# Patient Record
Sex: MALE | Race: WHITE | NOT HISPANIC OR LATINO | Employment: FULL TIME | URBAN - METROPOLITAN AREA
[De-identification: names, ages, dates, MRNs, and addresses within clinical notes are randomized per-mention and may not be internally consistent; named-entity substitution may affect disease eponyms.]

---

## 2023-10-03 ENCOUNTER — OFFICE VISIT (OUTPATIENT)
Dept: PRIMARY CARE | Facility: CLINIC | Age: 43
End: 2023-10-03
Payer: COMMERCIAL

## 2023-10-03 VITALS
WEIGHT: 217 LBS | OXYGEN SATURATION: 98 % | HEART RATE: 70 BPM | DIASTOLIC BLOOD PRESSURE: 81 MMHG | BODY MASS INDEX: 29.39 KG/M2 | RESPIRATION RATE: 18 BRPM | HEIGHT: 72 IN | TEMPERATURE: 97.5 F | SYSTOLIC BLOOD PRESSURE: 133 MMHG

## 2023-10-03 DIAGNOSIS — G90.2 ACQUIRED RIGHT-SIDED HORNER SYNDROME: ICD-10-CM

## 2023-10-03 DIAGNOSIS — R49.0 HOARSENESS OF VOICE: ICD-10-CM

## 2023-10-03 DIAGNOSIS — Z00.00 HEALTH CARE MAINTENANCE: Primary | ICD-10-CM

## 2023-10-03 DIAGNOSIS — F41.9 ANXIETY: ICD-10-CM

## 2023-10-03 DIAGNOSIS — E55.9 VITAMIN D DEFICIENCY: ICD-10-CM

## 2023-10-03 DIAGNOSIS — Z86.39 HISTORY OF HYPERLIPIDEMIA: ICD-10-CM

## 2023-10-03 DIAGNOSIS — R53.83 FATIGUE, UNSPECIFIED TYPE: ICD-10-CM

## 2023-10-03 DIAGNOSIS — Z86.018 HISTORY OF BENIGN SCHWANNOMA: ICD-10-CM

## 2023-10-03 LAB
POC APPEARANCE, URINE: CLEAR
POC BILIRUBIN, URINE: NEGATIVE
POC BLOOD, URINE: NEGATIVE
POC COLOR, URINE: YELLOW
POC GLUCOSE, URINE: NEGATIVE MG/DL
POC KETONES, URINE: NEGATIVE MG/DL
POC LEUKOCYTES, URINE: NEGATIVE
POC NITRITE,URINE: NEGATIVE
POC PH, URINE: 6.5 PH
POC PROTEIN, URINE: NEGATIVE MG/DL
POC SPECIFIC GRAVITY, URINE: 1.01
POC UROBILINOGEN, URINE: 0.2 EU/DL

## 2023-10-03 PROCEDURE — 1036F TOBACCO NON-USER: CPT | Performed by: FAMILY MEDICINE

## 2023-10-03 PROCEDURE — 81002 URINALYSIS NONAUTO W/O SCOPE: CPT | Performed by: FAMILY MEDICINE

## 2023-10-03 PROCEDURE — 99386 PREV VISIT NEW AGE 40-64: CPT | Performed by: FAMILY MEDICINE

## 2023-10-03 PROCEDURE — 93000 ELECTROCARDIOGRAM COMPLETE: CPT | Performed by: FAMILY MEDICINE

## 2023-10-03 RX ORDER — SERTRALINE HYDROCHLORIDE 100 MG/1
150 TABLET, FILM COATED ORAL DAILY
COMMUNITY

## 2023-10-03 ASSESSMENT — ENCOUNTER SYMPTOMS
LOSS OF SENSATION IN FEET: 0
DEPRESSION: 0
SHORTNESS OF BREATH: 0
HEADACHES: 0
OCCASIONAL FEELINGS OF UNSTEADINESS: 0

## 2023-10-03 ASSESSMENT — PATIENT HEALTH QUESTIONNAIRE - PHQ9
SUM OF ALL RESPONSES TO PHQ9 QUESTIONS 1 AND 2: 0
1. LITTLE INTEREST OR PLEASURE IN DOING THINGS: NOT AT ALL
2. FEELING DOWN, DEPRESSED OR HOPELESS: NOT AT ALL

## 2023-10-03 NOTE — PROGRESS NOTES
Subjective     Gómez Rasmussen is a 43 y.o. male who presents for Annual Exam.    HPI     Pt is here today for annual well exam.     He has hx of anxiety, on zoloft 150 mg daily, sees [facility] psychiatry.      Family hx of HLD in father.      Mother passed from lung cancer (never smoked), age 58.      Review of Systems   Respiratory:  Negative for shortness of breath.    Cardiovascular:  Negative for chest pain.   Neurological:  Negative for headaches.       Objective     Vitals:    10/03/23 1447   BP: 133/81   BP Location: Left arm   Patient Position: Sitting   Pulse: 70   Resp: 18   Temp: 36.4 °C (97.5 °F)   TempSrc: Temporal   SpO2: 98%   Weight: 98.4 kg (217 lb)   Height: 1.829 m (6')        Current Outpatient Medications   Medication Instructions    sertraline (ZOLOFT) 150 mg, oral, Daily        History reviewed. No pertinent surgical history.     Social History     Tobacco Use    Smoking status: Never    Smokeless tobacco: Never   Vaping Use    Vaping Use: Never used        Family History   Problem Relation Name Age of Onset    Lung cancer Mother      Hyperlipidemia Father          Immunization History   Administered Date(s) Administered    Flu vaccine (IIV4), preservative free *Check age/dose* 10/28/2016, 11/06/2022    Influenza, injectable, MDCK, preservative free, quadrivalent 10/01/2020, 09/21/2023    Influenza, seasonal, injectable 10/07/2014    Moderna COVID-19 vaccine, Fall 2023, 12 yeasrs and older (50mcg/0.5mL) 09/21/2023    Pfizer Purple Cap SARS-CoV-2 03/17/2021, 04/07/2021, 11/02/2021        Physical Exam  Vitals reviewed.   Constitutional:       General: He is not in acute distress.     Appearance: Normal appearance.      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    HENT:      Head: Normocephalic and atraumatic.      Comments: Right schwannoma surgical scar at right side of head         Right Ear: Tympanic membrane, ear canal and external ear  normal.      Left Ear: Tympanic membrane, ear canal and external ear normal.      Nose: Nose normal.      Mouth/Throat:      Mouth: Mucous membranes are moist.      Pharynx: Oropharynx is clear. No oropharyngeal exudate or posterior oropharyngeal erythema.   Eyes:      Extraocular Movements: Extraocular movements intact.      Pupils: Pupils are equal, round, and reactive to light.   Neck:      Thyroid: No thyroid mass or thyromegaly.   Cardiovascular:      Rate and Rhythm: Normal rate and regular rhythm.      Heart sounds: No murmur heard.  Pulmonary:      Effort: Pulmonary effort is normal. No respiratory distress.      Breath sounds: Normal breath sounds. No wheezing, rhonchi or rales.   Abdominal:      General: Abdomen is flat. There is no distension.      Palpations: Abdomen is soft.      Tenderness: There is no abdominal tenderness.   Genitourinary:     Testes: Normal.   Musculoskeletal:         General: Normal range of motion.   Lymphadenopathy:      Cervical: No cervical adenopathy.   Skin:     General: Skin is warm and dry.      Findings: No rash.   Neurological:      General: No focal deficit present.      Mental Status: He is alert and oriented to person, place, and time. Mental status is at baseline.   Psychiatric:         Mood and Affect: Mood normal.         Behavior: Behavior normal.         Problem List Items Addressed This Visit       Anxiety    Relevant Orders    TSH with reflex to Free T4 if abnormal    History of benign schwannoma    Acquired right-sided Sushma syndrome    Hoarseness of voice     Other Visit Diagnoses       Health care maintenance    -  Primary    Relevant Orders    POCT UA (nonautomated) manually resulted (Completed)    Comprehensive Metabolic Panel    Lipid Panel    CBC and Auto Differential    Hemoglobin A1C    CT cardiac scoring wo IV contrast    ECG 12 Lead (Completed)    Fatigue, unspecified type        Relevant Orders    TSH with reflex to Free T4 if abnormal    Vitamin D  deficiency        Relevant Orders    Vitamin D 25-Hydroxy,Total (for eval of Vitamin D levels)    History of hyperlipidemia        Relevant Orders    CT cardiac scoring wo IV contrast            Assessment/Plan     Well Exam     Vaccines - tdap declined    Flu/COVID  vaccine utd     Anxiety - sees [facility] - on zoloft 150 mg - controlled    Complete labs    Healthy diet, routine exercise was discussed with patient      Hx of HLD - not on statin    Cardiac risk assessment - I will order a CT calcium score due to patient risk factors.       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      Follow up in 6 months

## 2023-10-10 DIAGNOSIS — R53.83 FATIGUE, UNSPECIFIED TYPE: Primary | ICD-10-CM

## 2023-10-10 DIAGNOSIS — R68.82 LOW LIBIDO: ICD-10-CM

## 2023-10-12 ENCOUNTER — LAB (OUTPATIENT)
Dept: LAB | Facility: LAB | Age: 43
End: 2023-10-12
Payer: COMMERCIAL

## 2023-10-12 DIAGNOSIS — R68.82 LOW LIBIDO: ICD-10-CM

## 2023-10-12 DIAGNOSIS — E55.9 VITAMIN D DEFICIENCY: ICD-10-CM

## 2023-10-12 DIAGNOSIS — R53.83 FATIGUE, UNSPECIFIED TYPE: ICD-10-CM

## 2023-10-12 DIAGNOSIS — F41.9 ANXIETY: ICD-10-CM

## 2023-10-12 DIAGNOSIS — Z00.00 HEALTH CARE MAINTENANCE: ICD-10-CM

## 2023-10-12 LAB
25(OH)D3 SERPL-MCNC: 26 NG/ML (ref 30–100)
ALBUMIN SERPL BCP-MCNC: 4.3 G/DL (ref 3.4–5)
ALP SERPL-CCNC: 53 U/L (ref 33–120)
ALT SERPL W P-5'-P-CCNC: 28 U/L (ref 10–52)
ANION GAP SERPL CALC-SCNC: 12 MMOL/L (ref 10–20)
AST SERPL W P-5'-P-CCNC: 21 U/L (ref 9–39)
BASOPHILS # BLD AUTO: 0.06 X10*3/UL (ref 0–0.1)
BASOPHILS NFR BLD AUTO: 1.2 %
BILIRUB SERPL-MCNC: 0.4 MG/DL (ref 0–1.2)
BUN SERPL-MCNC: 17 MG/DL (ref 6–23)
CALCIUM SERPL-MCNC: 9.3 MG/DL (ref 8.6–10.3)
CHLORIDE SERPL-SCNC: 103 MMOL/L (ref 98–107)
CHOLEST SERPL-MCNC: 198 MG/DL (ref 0–199)
CHOLESTEROL/HDL RATIO: 4.6
CO2 SERPL-SCNC: 31 MMOL/L (ref 21–32)
CREAT SERPL-MCNC: 1 MG/DL (ref 0.5–1.3)
EOSINOPHIL # BLD AUTO: 0.17 X10*3/UL (ref 0–0.7)
EOSINOPHIL NFR BLD AUTO: 3.5 %
ERYTHROCYTE [DISTWIDTH] IN BLOOD BY AUTOMATED COUNT: 12.4 % (ref 11.5–14.5)
EST. AVERAGE GLUCOSE BLD GHB EST-MCNC: 111 MG/DL
GFR SERPL CREATININE-BSD FRML MDRD: >90 ML/MIN/1.73M*2
GLUCOSE SERPL-MCNC: 93 MG/DL (ref 74–99)
HBA1C MFR BLD: 5.5 %
HCT VFR BLD AUTO: 46.3 % (ref 41–52)
HDLC SERPL-MCNC: 42.6 MG/DL
HGB BLD-MCNC: 15.6 G/DL (ref 13.5–17.5)
IMM GRANULOCYTES # BLD AUTO: 0.01 X10*3/UL (ref 0–0.7)
IMM GRANULOCYTES NFR BLD AUTO: 0.2 % (ref 0–0.9)
LDLC SERPL CALC-MCNC: 125 MG/DL
LYMPHOCYTES # BLD AUTO: 2.29 X10*3/UL (ref 1.2–4.8)
LYMPHOCYTES NFR BLD AUTO: 46.6 %
MCH RBC QN AUTO: 28.8 PG (ref 26–34)
MCHC RBC AUTO-ENTMCNC: 33.7 G/DL (ref 32–36)
MCV RBC AUTO: 85 FL (ref 80–100)
MONOCYTES # BLD AUTO: 0.42 X10*3/UL (ref 0.1–1)
MONOCYTES NFR BLD AUTO: 8.6 %
NEUTROPHILS # BLD AUTO: 1.96 X10*3/UL (ref 1.2–7.7)
NEUTROPHILS NFR BLD AUTO: 39.9 %
NON HDL CHOLESTEROL: 155 MG/DL (ref 0–149)
NRBC BLD-RTO: 0 /100 WBCS (ref 0–0)
PLATELET # BLD AUTO: 280 X10*3/UL (ref 150–450)
PMV BLD AUTO: 9.7 FL (ref 7.5–11.5)
POTASSIUM SERPL-SCNC: 4.6 MMOL/L (ref 3.5–5.3)
PROT SERPL-MCNC: 6.7 G/DL (ref 6.4–8.2)
RBC # BLD AUTO: 5.42 X10*6/UL (ref 4.5–5.9)
SODIUM SERPL-SCNC: 141 MMOL/L (ref 136–145)
TRIGL SERPL-MCNC: 150 MG/DL (ref 0–149)
TSH SERPL-ACNC: 2.45 MIU/L (ref 0.44–3.98)
VLDL: 30 MG/DL (ref 0–40)
WBC # BLD AUTO: 4.9 X10*3/UL (ref 4.4–11.3)

## 2023-10-12 PROCEDURE — 82306 VITAMIN D 25 HYDROXY: CPT

## 2023-10-12 PROCEDURE — 80053 COMPREHEN METABOLIC PANEL: CPT

## 2023-10-12 PROCEDURE — 84443 ASSAY THYROID STIM HORMONE: CPT

## 2023-10-12 PROCEDURE — 80061 LIPID PANEL: CPT

## 2023-10-12 PROCEDURE — 84402 ASSAY OF FREE TESTOSTERONE: CPT

## 2023-10-12 PROCEDURE — 85025 COMPLETE CBC W/AUTO DIFF WBC: CPT

## 2023-10-12 PROCEDURE — 83036 HEMOGLOBIN GLYCOSYLATED A1C: CPT

## 2023-10-12 PROCEDURE — 36415 COLL VENOUS BLD VENIPUNCTURE: CPT

## 2023-10-13 ENCOUNTER — APPOINTMENT (OUTPATIENT)
Dept: RADIOLOGY | Facility: CLINIC | Age: 43
End: 2023-10-13
Payer: COMMERCIAL

## 2023-10-17 ENCOUNTER — ANCILLARY PROCEDURE (OUTPATIENT)
Dept: RADIOLOGY | Facility: CLINIC | Age: 43
End: 2023-10-17
Payer: COMMERCIAL

## 2023-10-17 DIAGNOSIS — Z00.00 HEALTH CARE MAINTENANCE: ICD-10-CM

## 2023-10-17 DIAGNOSIS — Z86.39 HISTORY OF HYPERLIPIDEMIA: ICD-10-CM

## 2023-10-17 LAB
TESTOSTERONE FREE (CHAN): 69.4 PG/ML (ref 35–155)
TESTOSTERONE,TOTAL,LC-MS/MS: 367 NG/DL (ref 250–1100)

## 2023-10-17 PROCEDURE — 75571 CT HRT W/O DYE W/CA TEST: CPT

## 2023-10-19 DIAGNOSIS — I77.810 DILATION OF THORACIC AORTA (CMS-HCC): ICD-10-CM

## 2023-10-19 DIAGNOSIS — R91.8 OPACITY OF LUNG ON IMAGING STUDY: ICD-10-CM

## 2023-10-19 DIAGNOSIS — R91.1 LUNG NODULE: Primary | ICD-10-CM

## 2023-10-23 DIAGNOSIS — J98.59 MEDIASTINAL ABNORMALITY: Primary | ICD-10-CM

## 2023-10-23 DIAGNOSIS — R93.89 ABNORMAL CT OF THE CHEST: ICD-10-CM

## 2023-10-24 PROBLEM — R53.81 MALAISE: Status: ACTIVE | Noted: 2023-10-24

## 2023-10-24 PROBLEM — M54.12 CERVICAL RADICULOPATHY: Status: ACTIVE | Noted: 2023-10-24

## 2023-10-24 PROBLEM — J38.01 VOCAL FOLD PARALYSIS, RIGHT: Status: ACTIVE | Noted: 2017-05-17

## 2023-10-24 PROBLEM — D36.10 SCHWANNOMA: Status: ACTIVE | Noted: 2022-06-21

## 2023-10-24 PROBLEM — J02.9 PHARYNGITIS: Status: ACTIVE | Noted: 2023-10-24

## 2023-10-24 PROBLEM — F41.1 GENERALIZED ANXIETY DISORDER: Status: ACTIVE | Noted: 2020-10-06

## 2023-10-24 PROBLEM — R29.2 HYPERREFLEXIA: Status: ACTIVE | Noted: 2023-10-24

## 2023-10-24 PROBLEM — M48.02 FORAMINAL STENOSIS OF CERVICAL REGION: Status: ACTIVE | Noted: 2023-10-24

## 2023-10-24 PROBLEM — F33.1 MODERATE EPISODE OF RECURRENT MAJOR DEPRESSIVE DISORDER (MULTI): Chronic | Status: ACTIVE | Noted: 2020-10-06

## 2023-10-24 RX ORDER — ALBUTEROL SULFATE 90 UG/1
AEROSOL, METERED RESPIRATORY (INHALATION)
COMMUNITY
Start: 2023-03-18 | End: 2023-10-26 | Stop reason: ALTCHOICE

## 2023-10-24 RX ORDER — PREDNISONE 20 MG/1
1 TABLET ORAL DAILY
COMMUNITY
Start: 2016-03-10 | End: 2023-10-26 | Stop reason: ALTCHOICE

## 2023-10-24 RX ORDER — FLUTICASONE PROPIONATE 50 MCG
SPRAY, SUSPENSION (ML) NASAL
COMMUNITY
Start: 2021-03-29

## 2023-10-24 RX ORDER — VIT C/E/ZN/COPPR/LUTEIN/ZEAXAN 250MG-90MG
1000 CAPSULE ORAL
COMMUNITY
Start: 2023-06-16

## 2023-10-26 ENCOUNTER — TELEMEDICINE (OUTPATIENT)
Dept: PRIMARY CARE | Facility: CLINIC | Age: 43
End: 2023-10-26
Payer: COMMERCIAL

## 2023-10-26 DIAGNOSIS — R91.1 LUNG NODULE: Primary | ICD-10-CM

## 2023-10-26 DIAGNOSIS — R91.8 OPACITY OF LUNG ON IMAGING STUDY: ICD-10-CM

## 2023-10-26 PROCEDURE — 99202 OFFICE O/P NEW SF 15 MIN: CPT | Performed by: NURSE PRACTITIONER

## 2023-10-26 SDOH — ECONOMIC STABILITY: FOOD INSECURITY: WITHIN THE PAST 12 MONTHS, YOU WORRIED THAT YOUR FOOD WOULD RUN OUT BEFORE YOU GOT MONEY TO BUY MORE.: NEVER TRUE

## 2023-10-26 SDOH — ECONOMIC STABILITY: FOOD INSECURITY: WITHIN THE PAST 12 MONTHS, THE FOOD YOU BOUGHT JUST DIDN'T LAST AND YOU DIDN'T HAVE MONEY TO GET MORE.: NEVER TRUE

## 2023-10-26 ASSESSMENT — PATIENT HEALTH QUESTIONNAIRE - PHQ9
SUM OF ALL RESPONSES TO PHQ9 QUESTIONS 1 AND 2: 0
2. FEELING DOWN, DEPRESSED OR HOPELESS: NOT AT ALL
1. LITTLE INTEREST OR PLEASURE IN DOING THINGS: NOT AT ALL

## 2023-10-26 ASSESSMENT — ENCOUNTER SYMPTOMS
OCCASIONAL FEELINGS OF UNSTEADINESS: 0
LOSS OF SENSATION IN FEET: 0
DEPRESSION: 0

## 2023-10-26 ASSESSMENT — COLUMBIA-SUICIDE SEVERITY RATING SCALE - C-SSRS
6. HAVE YOU EVER DONE ANYTHING, STARTED TO DO ANYTHING, OR PREPARED TO DO ANYTHING TO END YOUR LIFE?: NO
2. HAVE YOU ACTUALLY HAD ANY THOUGHTS OF KILLING YOURSELF?: NO
1. IN THE PAST MONTH, HAVE YOU WISHED YOU WERE DEAD OR WISHED YOU COULD GO TO SLEEP AND NOT WAKE UP?: NO

## 2023-10-26 NOTE — PATIENT INSTRUCTIONS
Coronary artery score of 0  6 mm nodule in the left upper lobe and faint groundglass opacity in visualized portions of the right lung in the presence of bronchitis.  Recommend CT chest 1 month.  Patient will be notified of results as they become available.  ?thymic mass - follow up with pcp  ascending thoracic aorta measures 4.1 cm in diameter. Patient states he will be following up at [facility] with this.

## 2023-10-26 NOTE — PROGRESS NOTES
Subjective   Patient ID: Gómez Rasmussen is a 43 y.o. male who presents for New Patient Visit (Gómez Rasmussen has a virtual visit regarding a lung nodule.  Never used tobacco products. No personal history of cancer. Mother had lung cancer./)  at age 58.  HPI 43-year-old male presents today for lung nodule clinic.  New patient information packet sent to patient's home.  Questions answered.    CT cardiac score dated 10/17/2023  6 mm left upper lobe nodule as described above.   Faint areas of ground-glass opacity in the visualized portion of  the right lung which might be inflammatory or infectious in etiology.    ascending thoracic aorta measures 4.1 cm in diameter. Patient states he will be following up at [facility] with this.     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  Gómez states he will follow up with PCP regarding this.   We should have a better view with dedicated ct chest next month.      Review of Systems  Review of systems: Present-not feeling well. Not present-chills, fatigue and fever.  Respiratory: cough.  Not present-difficulty breathing, bloody sputum.  Cardiovascular: Not present-chest pain, palpitations, dyspnea on exertion.    Objective   There were no vitals taken for this visit.     Physical Exam  Gen.: Mental status-alert. Gen. appearance-cooperative, well groomed and consistent with stated age. Not in acute distress or sickly. Orientation-oriented to time, place, purpose and person. Build and nutrition-well-nourished and well-developed. Hydration-well-hydrated.    Assessment/Plan

## 2023-10-30 ENCOUNTER — TELEPHONE (OUTPATIENT)
Dept: PRIMARY CARE | Facility: CLINIC | Age: 43
End: 2023-10-30
Payer: COMMERCIAL

## 2023-11-27 ENCOUNTER — ANCILLARY PROCEDURE (OUTPATIENT)
Dept: RADIOLOGY | Facility: CLINIC | Age: 43
End: 2023-11-27
Payer: COMMERCIAL

## 2023-11-27 DIAGNOSIS — R91.8 OPACITY OF LUNG ON IMAGING STUDY: ICD-10-CM

## 2023-11-27 DIAGNOSIS — R91.1 LUNG NODULE: ICD-10-CM

## 2023-11-27 PROCEDURE — 71250 CT THORAX DX C-: CPT

## 2023-11-27 PROCEDURE — 71250 CT THORAX DX C-: CPT | Performed by: RADIOLOGY

## 2023-11-28 ENCOUNTER — TELEPHONE (OUTPATIENT)
Dept: PRIMARY CARE | Facility: CLINIC | Age: 43
End: 2023-11-28
Payer: COMMERCIAL

## 2023-11-28 NOTE — TELEPHONE ENCOUNTER
Mr. Rasmussen has completed his scan 11/27/2023 for [facility] follow up.  He is agreeable to results communication through [employer].  Thank you

## 2023-12-02 DIAGNOSIS — R91.8 MULTIPLE LUNG NODULES ON CT: Primary | ICD-10-CM

## 2024-01-29 ENCOUNTER — OFFICE VISIT (OUTPATIENT)
Dept: PRIMARY CARE | Facility: CLINIC | Age: 44
End: 2024-01-29
Payer: COMMERCIAL

## 2024-01-29 VITALS
SYSTOLIC BLOOD PRESSURE: 125 MMHG | OXYGEN SATURATION: 95 % | DIASTOLIC BLOOD PRESSURE: 83 MMHG | WEIGHT: 221.2 LBS | HEIGHT: 72 IN | RESPIRATION RATE: 20 BRPM | TEMPERATURE: 97.9 F | HEART RATE: 69 BPM | BODY MASS INDEX: 29.96 KG/M2

## 2024-01-29 DIAGNOSIS — R91.1 LUNG NODULE: ICD-10-CM

## 2024-01-29 DIAGNOSIS — N50.811 TESTICULAR PAIN, RIGHT: Primary | ICD-10-CM

## 2024-01-29 DIAGNOSIS — I77.810 DILATATION OF THORACIC AORTA (CMS-HCC): ICD-10-CM

## 2024-01-29 DIAGNOSIS — R91.8 OPACITY OF LUNG ON IMAGING STUDY: ICD-10-CM

## 2024-01-29 PROBLEM — D36.10 SCHWANNOMA: Status: RESOLVED | Noted: 2022-06-21 | Resolved: 2024-01-29

## 2024-01-29 PROCEDURE — 99214 OFFICE O/P EST MOD 30 MIN: CPT | Performed by: FAMILY MEDICINE

## 2024-01-29 PROCEDURE — 1036F TOBACCO NON-USER: CPT | Performed by: FAMILY MEDICINE

## 2024-01-29 ASSESSMENT — ENCOUNTER SYMPTOMS
SHORTNESS OF BREATH: 0
HEADACHES: 0

## 2024-01-29 NOTE — PROGRESS NOTES
Subjective     Gómez Rasmussen is a 43 y.o. male who presents for Testicle Pain (42 y/o male presents to the office for right testicular pain. Per patient pain began about 2 weeks ago. ).    Testicle Pain  The patient's primary symptoms include testicular pain. Pertinent negatives include no chest pain, headaches or shortness of breath.        Pt had CT cardiac score of 0 in 10/2023, however it had a few incidental findings including residual possible thymus tissue, mildly dilated ascending aorta, faint ground glass opacity, lung nodule.  He had lung nodule clinic evaluation with repeat CT chest without contrast which showed sub centimeter lung nodules, he was advised to repeat the CT chest in one year.  Pt is a nonsmoker, never smoker.  I placed a MRI chest order in to have the possible thymus tissue evaluated to rule out thymoma, however patient declines.  He had [facility] cardio evaluation for the dilated aorta on 1/24/24, he had an echo completed as well (4.3 cm)  and told to get a CT angio chest which patient will schedule.     He is here today to discuss right testicular pain which started two weeks ago, no known injury.  No testicular mass palpated.  Hx of vasectomy in 2023.  No hernia noted.  No fevers or chills. No abdominal pian.      Review of Systems   Respiratory:  Negative for shortness of breath.    Cardiovascular:  Negative for chest pain.   Genitourinary:  Positive for testicular pain.   Neurological:  Negative for headaches.       Objective     Vitals:    01/29/24 1454   BP: 125/83   BP Location: Right arm   Patient Position: Sitting   Pulse: 69   Resp: 20   Temp: 36.6 °C (97.9 °F)   TempSrc: Temporal   SpO2: 95%   Weight: 100 kg (221 lb 3.2 oz)   Height: 1.829 m (6')        Current Outpatient Medications   Medication Instructions    cholecalciferol (VITAMIN D-3) 1,000 Units, oral, Daily RT    fluticasone (Flonase) 50 mcg/actuation nasal spray Use 1 Spray in each nostril daily at bedtime.    sertraline  (ZOLOFT) 150 mg, oral, Daily        Past Surgical History:   Procedure Laterality Date    TUMOR REMOVAL          Social History     Tobacco Use    Smoking status: Never    Smokeless tobacco: Never   Vaping Use    Vaping Use: Never used   Substance Use Topics    Alcohol use: Not Currently    Drug use: Not Currently        Family History   Problem Relation Name Age of Onset    Lung cancer Mother      Hyperlipidemia Father          Immunization History   Administered Date(s) Administered    Flu vaccine (IIV4), preservative free *Check age/dose* 10/28/2016, 11/06/2022    Flu vaccine, quadrivalent, no egg protein, age 6 month or greater (FLUCELVAX) 10/01/2020, 09/21/2023    Influenza, seasonal, injectable 10/07/2014    Moderna COVID-19 vaccine, Fall 2023, 12 yeasrs and older (50mcg/0.5mL) 09/21/2023    Pfizer Purple Cap SARS-CoV-2 03/17/2021, 04/07/2021, 11/02/2021        Physical Exam  Vitals reviewed.   Constitutional:       General: He is not in acute distress.     Appearance: Normal appearance. He is well-developed.   HENT:      Head: Normocephalic and atraumatic.   Eyes:      General: Lids are normal.      Conjunctiva/sclera:      Right eye: Right conjunctiva is not injected.      Left eye: Left conjunctiva is not injected.   Cardiovascular:      Rate and Rhythm: Normal rate and regular rhythm.      Heart sounds: No murmur heard.  Pulmonary:      Effort: Pulmonary effort is normal. No respiratory distress.      Breath sounds: Normal breath sounds. No wheezing, rhonchi or rales.   Abdominal:      General: Abdomen is flat.      Palpations: Abdomen is soft.      Tenderness: There is no abdominal tenderness. There is no right CVA tenderness, left CVA tenderness or guarding.   Genitourinary:     Testes: Normal.      Comments: Mild ttp over right testicle.  No sign of epididymitis on exam.  No mass palpated.  No hernia  Skin:     General: Skin is warm and dry.      Findings: No rash.   Neurological:      Mental Status: He  is alert and oriented to person, place, and time. Mental status is at baseline.   Psychiatric:         Mood and Affect: Mood normal.         Behavior: Behavior normal.         Problem List Items Addressed This Visit       Dilatation of thoracic aorta (CMS/HCC)    Lung nodule    Opacity of lung on imaging study     Other Visit Diagnoses       Testicular pain, right    -  Primary    Relevant Orders    US scrotum            Assessment/Plan     Testicular pain, right - scrotal ultrasound ordered        Aortic dilation (thoracic)- sees [facility] cardio, had echo done, will be getting CT angio chest as well.      Lung nodule/opacity of lung on imaging (CT calcium score) - evaluated by lung nodule clinic    Anterior mediastinum tissue noted on CT calcium score - I recommended patient complete MRI chest with and without contrast to evaluate for possible thymoma however patient declined.      Anxiety - sees [facility] Psych - on zoloft 150 mg - controlled     Hx of schwannoma - as a child s/p surgery -, aquired thanh syndrome , right side.      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      Follow up after ultrasound or sooner if needed.

## 2024-01-31 ENCOUNTER — HOSPITAL ENCOUNTER (OUTPATIENT)
Dept: RADIOLOGY | Facility: HOSPITAL | Age: 44
Discharge: HOME | End: 2024-01-31
Payer: COMMERCIAL

## 2024-01-31 DIAGNOSIS — N50.811 TESTICULAR PAIN, RIGHT: ICD-10-CM

## 2024-01-31 PROCEDURE — 93976 VASCULAR STUDY: CPT | Performed by: RADIOLOGY

## 2024-01-31 PROCEDURE — 76870 US EXAM SCROTUM: CPT | Performed by: RADIOLOGY

## 2024-01-31 PROCEDURE — 93975 VASCULAR STUDY: CPT

## 2024-02-01 DIAGNOSIS — N50.811 TESTICULAR PAIN, RIGHT: Primary | ICD-10-CM

## 2024-02-01 DIAGNOSIS — N50.3 EPIDIDYMAL CYST: ICD-10-CM

## 2024-09-12 ENCOUNTER — OFFICE VISIT (OUTPATIENT)
Dept: URGENT CARE | Age: 44
End: 2024-09-12
Payer: COMMERCIAL

## 2024-09-12 VITALS
SYSTOLIC BLOOD PRESSURE: 117 MMHG | BODY MASS INDEX: 29.8 KG/M2 | RESPIRATION RATE: 17 BRPM | DIASTOLIC BLOOD PRESSURE: 73 MMHG | OXYGEN SATURATION: 95 % | TEMPERATURE: 99 F | WEIGHT: 220 LBS | HEIGHT: 72 IN | HEART RATE: 71 BPM

## 2024-09-12 DIAGNOSIS — M79.662 PAIN OF LEFT CALF: Primary | ICD-10-CM

## 2024-09-12 RX ORDER — BUPROPION HYDROCHLORIDE 150 MG/1
1 TABLET ORAL
COMMUNITY
Start: 2024-07-17

## 2024-09-12 RX ORDER — METOPROLOL SUCCINATE 25 MG/1
TABLET, EXTENDED RELEASE ORAL DAILY
COMMUNITY

## 2024-09-12 ASSESSMENT — PAIN SCALES - GENERAL: PAINLEVEL: 3

## 2024-09-12 NOTE — PROGRESS NOTES
Subjective   Patient ID: Gómez Rasmussen is a 44 y.o. male. They present today with a chief complaint of Leg Pain (Left calf area, no redness).    History of Present Illness    Patient reports left calf pain for the past ~2-3 weeks. Denies hx of trauma or known injury. No reported hx of cancer, long car trips, recent air travel, recent major surgery, or hx of DVT. Notes that he primarily sits at a desk for work as a . Patient reports that his mother had an issue with her platelets and may have had a clotting disorder. Notes apparent pain at rest but that it hurts somewhat to walk on it. Denies SOB or pleuritic pain. Patient requests d-dimer lab if able.     Past Medical History  Allergies as of 09/12/2024    (No Known Allergies)       (Not in a hospital admission)       Past Medical History:   Diagnosis Date    Lung nodule        Past Surgical History:   Procedure Laterality Date    TUMOR REMOVAL          reports that he has never smoked. He has never used smokeless tobacco. He reports that he does not currently use alcohol. He reports that he does not currently use drugs.                                   Objective    Vitals:    09/12/24 1127   BP: 117/73   BP Location: Left arm   Patient Position: Sitting   BP Cuff Size: Adult   Pulse: 71   Resp: 17   Temp: 37.2 °C (99 °F)   SpO2: 95%   Weight: 99.8 kg (220 lb)   Height: 1.829 m (6')     No LMP for male patient.    Physical Exam  Constitutional:       General: He is not in acute distress.     Appearance: Normal appearance. He is not ill-appearing, toxic-appearing or diaphoretic.   HENT:      Nose: No rhinorrhea.   Eyes:      General: No scleral icterus.        Right eye: No discharge.         Left eye: No discharge.      Extraocular Movements: Extraocular movements intact.   Cardiovascular:      Comments: Appears well perfused  Pulmonary:      Effort: Pulmonary effort is normal.   Musculoskeletal:         General: Normal range of motion.      Cervical back:  Normal range of motion.      Left lower leg: Tenderness (slight ttp along medial aspect of calf) present. No deformity or lacerations.      Comments: LEFT LEG  Negative Homans   No swelling  No erythema  No warmth  No significant varicose veins  No pitting edema    Skin:     Findings: No bruising, erythema, lesion or rash.   Neurological:      Mental Status: He is alert.   Psychiatric:         Mood and Affect: Mood normal.         Behavior: Behavior normal.         Thought Content: Thought content normal.      Comments: Pleasant         No results found.    Diagnostic study results (if any) were reviewed by Jason Richardson MD.    Assessment/Plan   Allergies, medications, history, and pertinent labs/EKGs/Imaging reviewed by Jason Richardson MD.     Patient's left calf pain is likely 2/2 strain vs less likely DVT. Encouragingly, exam was largely unremarkable, Siomara's negative, Well's Score ~-1-0 points. Through shared decision making, patient preference, will order venous duplex for patient to complete at the ER's radiology department. Discussed that the physician is unable to follow up on the results/subsequently manage if the clot were positive in that event he would need to immediately go to the ER for further medical management; patient indicated understanding.     Orders and Diagnoses  There are no diagnoses linked to this encounter.    Medical Admin Record      Follow Up Instructions  No follow-ups on file.    Patient disposition: ED    Electronically signed by Jason Richardson MD  11:52 AM

## 2024-09-16 ENCOUNTER — HOSPITAL ENCOUNTER (OUTPATIENT)
Dept: RADIOLOGY | Facility: CLINIC | Age: 44
Discharge: HOME | End: 2024-09-16
Payer: COMMERCIAL

## 2024-09-16 DIAGNOSIS — M79.662 PAIN OF LEFT CALF: ICD-10-CM

## 2024-09-16 PROCEDURE — 93971 EXTREMITY STUDY: CPT

## 2025-02-03 ENCOUNTER — TELEPHONE (OUTPATIENT)
Dept: PRIMARY CARE | Facility: CLINIC | Age: 45
End: 2025-02-03
Payer: COMMERCIAL

## 2025-02-11 ENCOUNTER — TELEPHONE (OUTPATIENT)
Dept: PRIMARY CARE | Facility: CLINIC | Age: 45
End: 2025-02-11
Payer: COMMERCIAL

## 2025-02-18 ENCOUNTER — OFFICE VISIT (OUTPATIENT)
Facility: CLINIC | Age: 45
End: 2025-02-18
Payer: COMMERCIAL

## 2025-02-18 VITALS
TEMPERATURE: 97.9 F | BODY MASS INDEX: 29.43 KG/M2 | DIASTOLIC BLOOD PRESSURE: 88 MMHG | SYSTOLIC BLOOD PRESSURE: 131 MMHG | HEART RATE: 74 BPM | WEIGHT: 217 LBS | OXYGEN SATURATION: 95 % | RESPIRATION RATE: 18 BRPM

## 2025-02-18 DIAGNOSIS — R19.4 CHANGE IN BOWEL HABIT: ICD-10-CM

## 2025-02-18 DIAGNOSIS — R19.7 DIARRHEA, UNSPECIFIED TYPE: ICD-10-CM

## 2025-02-18 DIAGNOSIS — K92.1 HEMATOCHEZIA: Primary | ICD-10-CM

## 2025-02-18 DIAGNOSIS — F41.1 GENERALIZED ANXIETY DISORDER: ICD-10-CM

## 2025-02-18 DIAGNOSIS — R91.1 LUNG NODULE: ICD-10-CM

## 2025-02-18 DIAGNOSIS — I77.810 DILATATION OF THORACIC AORTA (CMS-HCC): ICD-10-CM

## 2025-02-18 DIAGNOSIS — E55.9 VITAMIN D DEFICIENCY: ICD-10-CM

## 2025-02-18 DIAGNOSIS — Z86.018 HISTORY OF BENIGN SCHWANNOMA: ICD-10-CM

## 2025-02-18 DIAGNOSIS — Z00.00 HEALTHCARE MAINTENANCE: ICD-10-CM

## 2025-02-18 DIAGNOSIS — F33.1 MODERATE EPISODE OF RECURRENT MAJOR DEPRESSIVE DISORDER: Chronic | ICD-10-CM

## 2025-02-18 PROCEDURE — 99214 OFFICE O/P EST MOD 30 MIN: CPT | Performed by: FAMILY MEDICINE

## 2025-02-18 ASSESSMENT — ENCOUNTER SYMPTOMS: DIARRHEA: 1

## 2025-02-18 NOTE — ASSESSMENT & PLAN NOTE
Pt evaluated by  lung nodule clinic in 2023.  Pt had CT angio chest in 2/2024.  <6 mm (solitary nodule).    Nonsmoker, never smoker.

## 2025-02-18 NOTE — PROGRESS NOTES
Subjective     Gómez Rasmussen is a 44 y.o. male who presents for Diarrhea.    Diarrhea        Pt is re-establishing care with me.      He is here due to increased urge to defecate, blood in stools, loose watery stools.  The blood is in toilet bowel and on toilet paper and on stools - bright red blood, moderate amount.  His urge to defecate and watery stools have been present intermittently for months but the blood in stools started last month.  He has never had GI evaluation.  No hx of colonoscopy.  He denies pain with defecation.  He denies any rectal/anal pain.  No hx of hemorrhoids.  No hx of constipation.  He reports the loose watery stools occur 3-4 times a day.  No abodminal pain.  No unintentional weight loss.      No family hx of colon cancer or IBD.      He does report his 1st cousin does have crohns disease.      Pt sees [facility] cardio, Dr. Andrews Kaufman, for hx of dilated aortic root.  He had CT angio chest in 2/2024 which showed moderate dilation of the aortic root (4.4 cm) and mild dilation of the mid ascending  thoracic aorta (4.2 cm).   Pt does know to follow up with cardio for monitoring of this.      Review of Systems   Gastrointestinal:  Positive for diarrhea.       Objective     Vitals:    02/18/25 1404   BP: 131/88   BP Location: Left arm   Patient Position: Sitting   Pulse: 74   Resp: 18   Temp: 36.6 °C (97.9 °F)   TempSrc: Temporal   SpO2: 95%   Weight: 98.4 kg (217 lb)        Current Outpatient Medications   Medication Instructions    buPROPion XL (Wellbutrin XL) 150 mg 24 hr tablet 1 tablet, Daily (0630)    cholecalciferol (VITAMIN D-3) 1,000 Units, Daily RT    fluticasone (Flonase) 50 mcg/actuation nasal spray Use 1 Spray in each nostril daily at bedtime.    metoprolol succinate XL (Toprol-XL) 25 mg 24 hr tablet Daily    sertraline (ZOLOFT) 150 mg, Daily        No Known Allergies     Past Surgical History:   Procedure Laterality Date    TUMOR REMOVAL          Social History     Tobacco Use     Smoking status: Never    Smokeless tobacco: Never   Vaping Use    Vaping status: Never Used   Substance Use Topics    Alcohol use: Not Currently    Drug use: Not Currently        Family History   Problem Relation Name Age of Onset    Lung cancer Mother      Hyperlipidemia Father          Immunization History   Administered Date(s) Administered    COVID-19, mRNA, LNP-S, PF, 30 mcg/0.3 mL dose 03/17/2021, 04/07/2021, 11/02/2021    Flu vaccine (IIV4), preservative free *Check age/dose* 10/28/2016, 11/06/2022    Flu vaccine, quadrivalent, no egg protein, age 6 month or greater (FLUCELVAX) 10/01/2020, 09/21/2023    Influenza, seasonal, injectable 10/07/2014    Moderna COVID-19 vaccine, 12 years and older (50mcg/0.5mL)(Spikevax) 09/21/2023    Pfizer COVID-19 vaccine, 12 years and older, (30mcg/0.3mL) (Comirnaty) 10/14/2024        Physical Exam  Vitals reviewed.   Constitutional:       General: He is not in acute distress.     Appearance: Normal appearance. He is well-developed.   HENT:      Head: Normocephalic and atraumatic.   Eyes:      General: Lids are normal.      Conjunctiva/sclera:      Right eye: Right conjunctiva is not injected.      Left eye: Left conjunctiva is not injected.   Cardiovascular:      Rate and Rhythm: Normal rate and regular rhythm.      Heart sounds: No murmur heard.  Pulmonary:      Effort: Pulmonary effort is normal. No respiratory distress.      Breath sounds: Normal breath sounds. No wheezing, rhonchi or rales.   Abdominal:      General: Abdomen is flat. Bowel sounds are normal. There is no distension.      Palpations: Abdomen is soft.      Tenderness: There is no abdominal tenderness. There is no right CVA tenderness, left CVA tenderness, guarding or rebound.      Hernia: No hernia is present.   Genitourinary:     Comments: MANOLO/rectal exam declined today   Skin:     General: Skin is warm and dry.      Findings: No rash.   Neurological:      Mental Status: He is alert and oriented to person,  place, and time. Mental status is at baseline.   Psychiatric:         Mood and Affect: Mood normal.         Behavior: Behavior normal.         Assessment & Plan  Hematochezia  See GI, complete labs.    Proceed to the nearest emergency department if condition worsens significantly/becomes severe in nature.   Orders:    CBC and Auto Differential; Future    Referral to Gastroenterology; Future    Change in bowel habit  See GI.    Orders:    CBC and Auto Differential; Future    Comprehensive Metabolic Panel; Future    Referral to Gastroenterology; Future    Diarrhea, unspecified type    Orders:    Referral to Gastroenterology; Future    Healthcare maintenance  Follow up 1 month for well exam  Orders:    CBC and Auto Differential; Future    Comprehensive Metabolic Panel; Future    Lipid Panel; Future    Hemoglobin A1C; Future    Vitamin D deficiency    Orders:    Vitamin D 25-Hydroxy,Total (for eval of Vitamin D levels); Future    Dilatation of thoracic aorta (CMS-HCC)  Pt sees [facility] cardiology for monitoring.   Pt was advised to take metoprolol succinate by cardio but he has not been doing so due to side effects of dizziness. Pt will discuss with cardio.        History of benign schwannoma         Generalized anxiety disorder         Moderate episode of recurrent major depressive disorder  On zoloft 150 , wellbutrin, controlled        Lung nodule  Pt evaluated by  lung nodule clinic in 2023.  Pt had CT angio chest in 2/2024.  <6 mm (solitary nodule).    Nonsmoker, never smoker.

## 2025-02-18 NOTE — ASSESSMENT & PLAN NOTE
Pt sees [facility] cardiology for monitoring.   Pt was advised to take metoprolol succinate by cardio but he has not been doing so due to side effects of dizziness. Pt will discuss with cardio.

## 2025-03-26 ENCOUNTER — APPOINTMENT (OUTPATIENT)
Facility: CLINIC | Age: 45
End: 2025-03-26
Payer: COMMERCIAL